# Patient Record
Sex: MALE | Race: WHITE | HISPANIC OR LATINO | Employment: FULL TIME | URBAN - METROPOLITAN AREA
[De-identification: names, ages, dates, MRNs, and addresses within clinical notes are randomized per-mention and may not be internally consistent; named-entity substitution may affect disease eponyms.]

---

## 2019-09-02 ENCOUNTER — HOSPITAL ENCOUNTER (EMERGENCY)
Facility: HOSPITAL | Age: 29
Discharge: HOME | End: 2019-09-02
Attending: EMERGENCY MEDICINE

## 2019-09-02 VITALS
TEMPERATURE: 98 F | HEIGHT: 65 IN | HEART RATE: 86 BPM | DIASTOLIC BLOOD PRESSURE: 83 MMHG | SYSTOLIC BLOOD PRESSURE: 123 MMHG | WEIGHT: 130 LBS | OXYGEN SATURATION: 97 % | BODY MASS INDEX: 21.66 KG/M2 | RESPIRATION RATE: 20 BRPM

## 2019-09-02 DIAGNOSIS — S30.1XXA CONTUSION OF ABDOMINAL WALL, INITIAL ENCOUNTER: Primary | ICD-10-CM

## 2019-09-02 PROCEDURE — 99283 EMERGENCY DEPT VISIT LOW MDM: CPT

## 2019-09-02 RX ORDER — DICLOFENAC SODIUM 50 MG/1
50 TABLET, DELAYED RELEASE ORAL 3 TIMES DAILY PRN
Qty: 30 TABLET | Refills: 0 | Status: SHIPPED | OUTPATIENT
Start: 2019-09-02

## 2019-09-02 NOTE — ED PROVIDER NOTES
Encounter Date: 9/2/2019    SCRIBE #1 NOTE: I, Tyler Best, am scribing for, and in the presence of, Chalino White MD.       History     Chief Complaint   Patient presents with    Abdominal Pain     small eccymotic area        Time seen by provider: 12:47 PM on 09/02/2019    Jordan Causey is a 28 y.o. male who presents to the ED  with an onset of lower abdominal pain. The patient states the symptoms arose a ~1 day PTA. 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 He acknowledges it's likely the pain could be a result of hitting himself with the furniture. He reports that he just recently got over the flu ~1 week PTA. The patient denies blood in urine, blood in stool, pain in testicles, or any other symptoms at this time. No PSHx. No known drug allergies.    The history is provided by the patient.     Review of patient's allergies indicates:  No Known Allergies  No past medical history on file.  No past surgical history on file.  No family history on file.  Social History     Tobacco Use    Smoking status: Not on file   Substance Use Topics    Alcohol use: Not on file    Drug use: Not on file     Review of Systems   Constitutional: Negative for chills and fever.   HENT: Negative for nosebleeds.    Eyes: Negative for visual disturbance.   Respiratory: Negative for cough and shortness of breath.    Cardiovascular: Negative for chest pain and palpitations.   Gastrointestinal: Positive for abdominal pain. Negative for blood in stool, diarrhea, nausea and vomiting.   Genitourinary: Negative for dysuria, hematuria and testicular pain.   Musculoskeletal: Negative for back pain and neck pain.   Skin: Negative for rash.   Neurological: Negative for seizures, syncope and headaches.       Physical Exam     Initial Vitals [09/02/19 1241]   BP Pulse Resp Temp SpO2   123/83 86 20 98.4 °F (36.9 °C) 97 %      MAP       --         Physical Exam    Nursing note and vitals  reviewed.  Constitutional: He appears well-developed and well-nourished. He is not diaphoretic. No distress.   HENT:   Head: Normocephalic and atraumatic.   Eyes: EOM are normal. Pupils are equal, round, and reactive to light.   Neck: Normal range of motion. Neck supple.   Cardiovascular: Normal rate, regular rhythm, normal heart sounds and intact distal pulses. Exam reveals no gallop and no friction rub.    No murmur heard.  Pulmonary/Chest: Breath sounds normal. No respiratory distress. He has no wheezes. He has no rhonchi. He has no rales.   Abdominal: Soft. Bowel sounds are normal. There is no tenderness. There is no rebound and no guarding.   Lower abdominal pain does not include umbilicus. Small hematoma with ecchymosis, and tender region.   Musculoskeletal: Normal range of motion.   Neurological: He is alert and oriented to person, place, and time.   Skin: Skin is warm.   Psychiatric: He has a normal mood and affect. His behavior is normal. Judgment and thought content normal.         ED Course   Procedures  Labs Reviewed - No data to display       Imaging Results    None          Medical Decision Making:   History:   Old Medical Records: I decided to obtain old medical records.  Initial Assessment:   28-year-old male presented with abdominal pain.  ED Management:  The patient was urgently evaluated in the emergency department, his evaluation was significant for a well-appearing young male with an area of ecchymosis and tenderness noted to the lower mid abdomen.  I believe the patient injured his abdominal wall while moving furniture.  The patient has no signs of a hernia or intra-abdominal pathology.  He is stable for discharge home and I do not believe he needs any further workup at this time.  He will be discharged home with p.o. diclofenac and he is referred to primary care for follow-up.            Scribe Attestation:   Scribe #1: I performed the above scribed service and the documentation accurately  describes the services I performed. I attest to the accuracy of the note.           I, Dr. Chalino White, personally performed the services described in this documentation. All medical record entries made by the scribe were at my direction and in my presence.  I have reviewed the chart and agree that the record reflects my personal performance and is accurate and complete. Chalino White MD.  2:05 PM 09/02/2019       Clinical Impression:       ICD-10-CM ICD-9-CM   1. Contusion of abdominal wall, initial encounter S30.1XXA 922.2         Disposition:   Disposition: Discharged  Condition: Stable                        Chalino White MD  09/02/19 1406

## 2022-08-21 ENCOUNTER — HOSPITAL ENCOUNTER (EMERGENCY)
Facility: HOSPITAL | Age: 32
Discharge: HOME | End: 2022-08-21
Attending: EMERGENCY MEDICINE

## 2022-08-21 VITALS
WEIGHT: 130 LBS | SYSTOLIC BLOOD PRESSURE: 132 MMHG | HEIGHT: 65 IN | BODY MASS INDEX: 21.66 KG/M2 | DIASTOLIC BLOOD PRESSURE: 94 MMHG | OXYGEN SATURATION: 97 % | RESPIRATION RATE: 18 BRPM | HEART RATE: 90 BPM | TEMPERATURE: 100 F

## 2022-08-21 DIAGNOSIS — R19.8 CHANGE IN BOWEL MOVEMENT: Primary | ICD-10-CM

## 2022-08-21 PROCEDURE — 99281 EMR DPT VST MAYX REQ PHY/QHP: CPT

## 2022-08-21 NOTE — ED PROVIDER NOTES
Encounter Date: 8/21/2022       History     Chief Complaint   Patient presents with    bowel color abnormal     Says his stool has been green for past three days.       31-year-old male presents to the ER today with concern for an abnormal color of his bowel movement today.  He states when he went to the restroom he had a formed stool that was a dark green color.  He told his mom and his mom was worried that possibly could have an infection.  They both decided that they should come to the ER to further evaluate this concern.  He states no abdominal pain no diarrhea symptoms no nausea vomiting or fever.  He states he does not have any other associated symptoms or complaints at this time, he just would like to know the underlying cause of what caused his bowel movement to change colors from his normal medium brown color.  No previous medical history, no other complaints or concerns        Review of patient's allergies indicates:  No Known Allergies  No past medical history on file.  No past surgical history on file.  No family history on file.     Review of Systems   Constitutional: Negative for appetite change, chills, diaphoresis, fatigue and fever.   HENT: Negative for congestion and sore throat.    Eyes: Negative for photophobia and visual disturbance.   Respiratory: Negative for shortness of breath.    Cardiovascular: Negative for chest pain, palpitations and leg swelling.   Gastrointestinal: Negative for abdominal distention, abdominal pain, anal bleeding, blood in stool, constipation, diarrhea, nausea, rectal pain and vomiting.   Endocrine: Negative for polydipsia, polyphagia and polyuria.   Genitourinary: Negative for difficulty urinating, dysuria and flank pain.   Musculoskeletal: Negative for arthralgias, back pain, gait problem, joint swelling, myalgias, neck pain and neck stiffness.   Skin: Negative for color change, pallor, rash and wound.   Allergic/Immunologic: Negative for immunocompromised state.    Neurological: Negative for dizziness, seizures, syncope, weakness, light-headedness and headaches.   Hematological: Does not bruise/bleed easily.   Psychiatric/Behavioral: Negative for agitation, confusion, self-injury, sleep disturbance and suicidal ideas. The patient is not nervous/anxious.    All other systems reviewed and are negative.      Physical Exam     Initial Vitals [08/21/22 1114]   BP Pulse Resp Temp SpO2   (!) 165/97 90 18 98.9 °F (37.2 °C) 99 %      MAP       --         Physical Exam    Nursing note and vitals reviewed.  Constitutional: He appears well-developed and well-nourished. He is not diaphoretic.   HENT:   Head: Normocephalic and atraumatic.   Right Ear: External ear normal.   Left Ear: External ear normal.   Nose: Nose normal.   Mouth/Throat: Oropharynx is clear and moist.   Eyes: Conjunctivae are normal. Pupils are equal, round, and reactive to light.   Cardiovascular: Normal rate.   Pulmonary/Chest: Breath sounds normal. He has no wheezes. He has no rhonchi.   Abdominal: Abdomen is soft. He exhibits no distension and no mass. There is no abdominal tenderness. There is no rebound and no guarding.   Musculoskeletal:         General: No edema. Normal range of motion.     Neurological: He is alert and oriented to person, place, and time.   Skin: Skin is warm and dry.   Psychiatric: He has a normal mood and affect. His behavior is normal. Judgment and thought content normal.         ED Course   Procedures  Labs Reviewed - No data to display       Imaging Results    None          Medications - No data to display  Medical Decision Making:   Told pt if he has no other concerns associated with his formed stool other than green color, that it likely could be due to recent food he ate changing the color of stool. He reports no fever no abdominal pain no diarrhea symptoms no vomiting and at this time I have an overall low suspicion for infectious etiology.  Discussed this with the patient.  He states  he was relieved.  I did offer to place outpatient stool culture so he could read further evaluate if he would like but he declines and states he feels happy now just knowing that he likely did have an infection and does not want to proceed with any further additional workup.  I will recommend he follow-up with his PCP for ER visit follow-up and re-evaluation.  ER return precautions discussed he understands he should return for any new or worsening symptoms.                      Clinical Impression:   Final diagnoses:  [R19.8] Change in bowel movement (Primary)          ED Disposition Condition    Discharge Stable        ED Prescriptions     None        Follow-up Information     Follow up With Specialties Details Why Contact Info Additional Information    [facility]  Schedule an appointment as soon as possible for a visit in 3 days for ER visit follow up and re-evaluation [address] [facility] [id number]  [phone]       [facility] - Emergency Dept Emergency Medicine Go to  As needed, If symptoms worsen [address] [facility] [address]  [phone] 1st floor           Inga Arevalo NP  08/21/22 1951

## 2024-09-16 NOTE — Clinical Note
"Jordan Lerma" Padilla Mathur was seen and treated in our emergency department on 8/21/2022.  He may return to work on 08/21/2022.       If you have any questions or concerns, please don't hesitate to call.      Inga Arevalo NP"
No
done